# Patient Record
Sex: FEMALE | Race: BLACK OR AFRICAN AMERICAN | NOT HISPANIC OR LATINO | Employment: FULL TIME | URBAN - METROPOLITAN AREA
[De-identification: names, ages, dates, MRNs, and addresses within clinical notes are randomized per-mention and may not be internally consistent; named-entity substitution may affect disease eponyms.]

---

## 2018-07-17 ENCOUNTER — HOSPITAL ENCOUNTER (EMERGENCY)
Facility: HOSPITAL | Age: 33
Discharge: HOME | End: 2018-07-17
Payer: MEDICAID

## 2018-07-17 VITALS
RESPIRATION RATE: 16 BRPM | WEIGHT: 244 LBS | OXYGEN SATURATION: 99 % | HEIGHT: 64 IN | DIASTOLIC BLOOD PRESSURE: 74 MMHG | SYSTOLIC BLOOD PRESSURE: 118 MMHG | BODY MASS INDEX: 41.66 KG/M2 | TEMPERATURE: 98 F | HEART RATE: 82 BPM

## 2018-07-17 DIAGNOSIS — R11.0 NAUSEA: Primary | ICD-10-CM

## 2018-07-17 DIAGNOSIS — R19.7 DIARRHEA, UNSPECIFIED TYPE: ICD-10-CM

## 2018-07-17 DIAGNOSIS — R10.9 ABDOMINAL CRAMPING: ICD-10-CM

## 2018-07-17 PROCEDURE — 99283 EMERGENCY DEPT VISIT LOW MDM: CPT

## 2018-07-17 RX ORDER — ONDANSETRON 4 MG/1
4 TABLET, FILM COATED ORAL EVERY 6 HOURS PRN
Qty: 12 TABLET | Refills: 0 | Status: SHIPPED | OUTPATIENT
Start: 2018-07-17 | End: 2018-07-20

## 2018-07-17 RX ORDER — DICYCLOMINE HYDROCHLORIDE 20 MG/1
20 TABLET ORAL 3 TIMES DAILY
Qty: 9 TABLET | Refills: 0 | Status: SHIPPED | OUTPATIENT
Start: 2018-07-17 | End: 2018-07-20

## 2018-07-17 NOTE — ED PROVIDER NOTES
Encounter Date: 2018       History     Chief Complaint   Patient presents with    ate outdated food     Pt states ate outdated popcorn and is having nausea     33-year-old female presents to ED with complaints of nausea, abdominal cramping, and diarrhea for 2 days status post eating outdated popcorn.  Denies hematochezia, fever, vomiting, associated symptoms.  Tolerating by mouth and food without difficulty.          Review of patient's allergies indicates:  No Known Allergies  History reviewed. No pertinent past medical history.  Past Surgical History:   Procedure Laterality Date     SECTION       Family History   Problem Relation Age of Onset    No Known Problems Mother     No Known Problems Father      Social History   Substance Use Topics    Smoking status: Never Smoker    Smokeless tobacco: Never Used    Alcohol use No     Review of Systems   Constitutional: Negative.  Negative for appetite change and fever.   HENT: Negative.  Negative for ear discharge, ear pain and sore throat.    Eyes: Negative.  Negative for pain.   Respiratory: Negative.  Negative for chest tightness and shortness of breath.    Cardiovascular: Negative.  Negative for chest pain and palpitations.   Gastrointestinal: Positive for diarrhea and nausea. Negative for abdominal distention, abdominal pain, anal bleeding, constipation, rectal pain and vomiting.   Endocrine: Negative.  Negative for heat intolerance and polyphagia.   Genitourinary: Negative.  Negative for dysuria and pelvic pain.   Musculoskeletal: Negative.  Negative for back pain and myalgias.   Skin: Negative.  Negative for color change and rash.   Allergic/Immunologic: Negative.    Neurological: Negative.  Negative for weakness and headaches.   Hematological: Negative.  Does not bruise/bleed easily.   Psychiatric/Behavioral: Negative.    All other systems reviewed and are negative.      Physical Exam     Initial Vitals [18 1611]   BP Pulse Resp Temp SpO2    114/82 87 18 98.8 °F (37.1 °C) 97 %      MAP       --         Physical Exam    Nursing note and vitals reviewed.  Constitutional: Vital signs are normal. She appears well-developed and well-nourished.  Non-toxic appearance. No distress.   HENT:   Head: Normocephalic and atraumatic.   Nose: Nose normal.   Mouth/Throat: Uvula is midline and oropharynx is clear and moist.   Mucous membranes moist   Eyes: Conjunctivae, EOM and lids are normal.   Neck: Trachea normal, normal range of motion and full passive range of motion without pain. Neck supple. No thyromegaly present.   Cardiovascular: Normal rate, regular rhythm, S1 normal, S2 normal and normal heart sounds.   Pulmonary/Chest: Effort normal and breath sounds normal. No accessory muscle usage or stridor. No respiratory distress. She has no decreased breath sounds. She has no wheezes.   Abdominal: Soft. Normal appearance and bowel sounds are normal. She exhibits no distension. There is no tenderness. There is no rigidity, no rebound, no guarding and no CVA tenderness.   Musculoskeletal: Normal range of motion. She exhibits no tenderness.   Neurological: She is alert and oriented to person, place, and time. She has normal strength. No cranial nerve deficit or sensory deficit. GCS eye subscore is 4. GCS verbal subscore is 5. GCS motor subscore is 6.   Skin: Skin is warm and dry. Capillary refill takes less than 2 seconds.   Psychiatric: She has a normal mood and affect.         ED Course   Procedures  Labs Reviewed - No data to display       Imaging Results    None          Medical Decision Making:   Initial Assessment:   33-year-old female presents to ED with complaints of nausea, abdominal cramping, and diarrhea for 2 days status post eating outdated popcorn.  Denies hematochezia, fever, vomiting, associated symptoms.  Tolerating by mouth and food without difficulty.  Differential Diagnosis:   Food poisoning  Gastroenteritis  Nausea and vomiting  ED  Management:  Stable for discharge.  Zofran for nausea and increased fluids.  PCP recheck                   ED Course as of Jul 17 1711   Tue Jul 17, 2018   1659 No distress noted and pt well appearing.  Mucous membranes moist and skin supple.  Abdomen soft and non tender.  Tolerating p.o.  Will treat nausea with zofran.  Increased fluids encourage.  Pt non toxic appearing and stable for discharge.  In agreement with plan of care.  PCP recheck recommended in 2-3 days.  Zofran as needed for nausea and Bentyl as needed for cramping  [LG]      ED Course User Index  [LG] Regina Dewey NP     Clinical Impression:   The primary encounter diagnosis was Nausea. Diagnoses of Diarrhea, unspecified type and Abdominal cramping were also pertinent to this visit.                             Regina Dewey NP  07/18/18 [id number]

## 2018-07-17 NOTE — DISCHARGE INSTRUCTIONS
Drink plenty of fluids.  Zofran as needed for nausea and Bentyl as needed for abdominal cramping.  Follow-up primary care in 2-3 days.  Return for any worsening of symptoms or complications including abdominal pain, projectile vomiting, fever, inability to down fluids or medications, bloody stool, vomiting blood, or any other issues.

## 2019-12-27 ENCOUNTER — HOSPITAL ENCOUNTER (EMERGENCY)
Facility: HOSPITAL | Age: 34
Discharge: HOME | End: 2019-12-27
Attending: EMERGENCY MEDICINE
Payer: COMMERCIAL

## 2019-12-27 VITALS
BODY MASS INDEX: 46.1 KG/M2 | HEART RATE: 81 BPM | SYSTOLIC BLOOD PRESSURE: 132 MMHG | WEIGHT: 270 LBS | TEMPERATURE: 98 F | RESPIRATION RATE: 18 BRPM | DIASTOLIC BLOOD PRESSURE: 72 MMHG | HEIGHT: 64 IN | OXYGEN SATURATION: 100 %

## 2019-12-27 DIAGNOSIS — M25.562 ACUTE PAIN OF BOTH KNEES: ICD-10-CM
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
## 2019-12-27 DIAGNOSIS — M25.561 ACUTE PAIN OF BOTH KNEES: ICD-10-CM

## 2019-12-27 DIAGNOSIS — S39.012A LUMBAR STRAIN, INITIAL ENCOUNTER: ICD-10-CM

## 2019-12-27 PROCEDURE — 99284 EMERGENCY DEPT VISIT MOD MDM: CPT | Mod: 25,ER

## 2019-12-27 PROCEDURE — 25000003 PHARM REV CODE 250: Mod: ER | Performed by: PHYSICIAN ASSISTANT

## 2019-12-27 RX ORDER — KETOROLAC TROMETHAMINE 10 MG/1
10 TABLET, FILM COATED ORAL
Status: COMPLETED | OUTPATIENT
Start: 2019-12-27 | End: 2019-12-27

## 2019-12-27 RX ORDER — METHOCARBAMOL 500 MG/1
500 TABLET, FILM COATED ORAL 3 TIMES DAILY
Qty: 15 TABLET | Refills: 0 | Status: SHIPPED | OUTPATIENT
Start: 2019-12-27 | End: 2020-01-01

## 2019-12-27 RX ORDER — KETOROLAC TROMETHAMINE 30 MG/ML
30 INJECTION, SOLUTION INTRAMUSCULAR; INTRAVENOUS
Status: DISCONTINUED | OUTPATIENT
Start: 2019-12-27 | End: 2019-12-27

## 2019-12-27 RX ORDER — KETOROLAC TROMETHAMINE 10 MG/1
10 TABLET, FILM COATED ORAL EVERY 6 HOURS
Qty: 10 TABLET | Refills: 0 | Status: SHIPPED | OUTPATIENT
Start: 2019-12-27 | End: 2022-03-22 | Stop reason: CLARIF

## 2019-12-27 RX ADMIN — KETOROLAC TROMETHAMINE 10 MG: 10 TABLET, FILM COATED ORAL at 06:12

## 2019-12-27 NOTE — ED NOTES
APPEARANCE: Alert, oriented and in no acute distress.  CARDIAC: Normal rate and rhythm, no murmur heard.   PERIPHERAL VASCULAR: peripheral pulses present. Normal cap refill. No edema. Warm to touch.    RESPIRATORY:Normal rate and effort, breath sounds clear bilaterally throughout chest. Respirations are equal and unlabored no obvious signs of distress.  GASTRO: soft, bowel sounds normal, no tenderness, no abdominal distention.  MUSC: Complains of left lower back pain that radiates to top of buttocks. No obvious deformity. Also complains of right knee pain.   SKIN: Skin is warm and dry, normal skin turgor, mucous membranes moist.  NEURO: 5/5 strength major flexors/extensors bilaterally. Sensory intact to light touch bilaterally. Cj coma scale: eyes open spontaneously-4, oriented & converses-5, obeys commands-6. No neurological abnormalities.   MENTAL STATUS: awake, alert and aware of environment.  EYE: PERRL, both eyes: pupils brisk and reactive to light. Normal size.  ENT: EARS: no obvious drainage. NOSE: no active bleeding.
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
## 2022-03-22 ENCOUNTER — HOSPITAL ENCOUNTER (EMERGENCY)
Facility: HOSPITAL | Age: 37
Discharge: HOME | End: 2022-03-22
Attending: EMERGENCY MEDICINE

## 2022-03-22 VITALS
DIASTOLIC BLOOD PRESSURE: 89 MMHG | OXYGEN SATURATION: 100 % | HEART RATE: 100 BPM | SYSTOLIC BLOOD PRESSURE: 158 MMHG | RESPIRATION RATE: 18 BRPM | TEMPERATURE: 98 F

## 2022-03-22 DIAGNOSIS — R05.9 COUGH: ICD-10-CM

## 2022-03-22 DIAGNOSIS — J06.9 VIRAL URI WITH COUGH: Primary | ICD-10-CM

## 2022-03-22 PROCEDURE — 99284 EMERGENCY DEPT VISIT MOD MDM: CPT | Mod: 25,ER

## 2022-03-22 RX ORDER — FLUTICASONE PROPIONATE 50 MCG
2 SPRAY, SUSPENSION (ML) NASAL 2 TIMES DAILY PRN
Qty: 15 G | Refills: 0 | Status: SHIPPED | OUTPATIENT
Start: 2022-03-22

## 2022-03-22 RX ORDER — BENZONATATE 100 MG/1
100 CAPSULE ORAL 3 TIMES DAILY PRN
Qty: 20 CAPSULE | Refills: 0 | Status: SHIPPED | OUTPATIENT
Start: 2022-03-22 | End: 2022-04-21

## 2022-03-22 NOTE — ED PROVIDER NOTES
Encounter Date: 3/22/2022       History     Chief Complaint   Patient presents with    Cough     I am coughing and having sinus problems for 3 weeks. It did go away and then it came right back. I smell infection in my nose and its making me nauseated      37-year-old female presents with cough and sinus congestion for the past 3 weeks.  Patient has been taking Aleve, Tylenol and DayQuil without improvement in symptoms.  No sore throat.  No abdominal pain, vomiting, diarrhea chest pain.          Review of patient's allergies indicates:  No Known Allergies  History reviewed. No pertinent past medical history.  Past Surgical History:   Procedure Laterality Date     SECTION       Family History   Problem Relation Age of Onset    No Known Problems Mother     No Known Problems Father      Social History     Tobacco Use    Smoking status: Never Smoker    Smokeless tobacco: Never Used   Substance Use Topics    Alcohol use: No    Drug use: No     Review of Systems   Constitutional: Negative for fever.   HENT: Positive for congestion. Negative for sore throat.    Respiratory: Positive for cough. Negative for shortness of breath.    Cardiovascular: Negative for chest pain.   Gastrointestinal: Positive for nausea.   Neurological: Positive for headaches.       Physical Exam     Initial Vitals [22 0944]   BP Pulse Resp Temp SpO2   (!) 161/97 (!) 113 15 98.4 °F (36.9 °C) 100 %      MAP       --         Physical Exam    Nursing note and vitals reviewed.  HENT:   Head: Atraumatic.   Mouth/Throat: Oropharynx is clear and moist.   Sinus congestion   Eyes: Conjunctivae and EOM are normal. Pupils are equal, round, and reactive to light.   Neck:   Normal range of motion.  Cardiovascular: Exam reveals no gallop and no friction rub.    No murmur heard.  Pulmonary/Chest: Breath sounds normal. No respiratory distress.   Musculoskeletal:      Cervical back: Normal range of motion.     Neurological: She is alert and  oriented to person, place, and time.         ED Course   Procedures  Labs Reviewed - No data to display       Imaging Results          X-Ray Chest PA And Lateral (Final result)  Result time 03/22/22 10:11:22    Final result by USMAN Kelly Sr., MD (03/22/22 10:11:22)                 Impression:      Normal study.      Electronically signed by: Shyam Kelly MD  Date:    03/22/2022  Time:    10:11             Narrative:    EXAMINATION:  XR CHEST PA AND LATERAL    CLINICAL HISTORY:  Cough, unspecified    COMPARISON:  None    FINDINGS:  The size and contour of the heart are normal. The lungs are clear. There is no pneumothorax or pleural effusion.                                 Medications - No data to display  Medical Decision Making:   Initial Assessment:   37-year-old female presenting with sinus congestion and cough.  Appears well.  Lungs are clear.  Offered COVID swab but patient is declining.  Chest x-ray shows no infiltrates to suggest pneumonia.  Suspect viral upper respiratory infection with bronchitis.  No indication for antibiotics at this time.  Plan for symptomatic treatment and primary care follow-up.                      Clinical Impression:   Final diagnoses:  [R05.9] Cough  [J06.9] Viral URI with cough (Primary)          ED Disposition Condition    Discharge Stable        ED Prescriptions     Medication Sig Dispense Start Date End Date Auth. Provider    fluticasone propionate (FLONASE) 50 mcg/actuation nasal spray 2 sprays (100 mcg total) by Each Nostril route 2 (two) times daily as needed for Rhinitis (congestion). 15 g 3/22/2022  Vasyl Fernandez MD    benzonatate (TESSALON) 100 MG capsule Take 1 capsule (100 mg total) by mouth 3 (three) times daily as needed for Cough. 20 capsule 3/22/2022 4/21/2022 Vasyl Fernandez MD        Follow-up Information     Follow up With Specialties Details Why Contact Info    Gudelia Sargent MD Internal Medicine Schedule an appointment as soon as possible for a  visit in 1 week  [address]  [address]  [facility]  [place] [address]  [phone]      Primary care  Schedule an appointment as soon as possible for a visit in 1 week             Vasyl Fernandez MD  03/22/22 8806

## 2022-03-22 NOTE — Clinical Note
"Gisele Panchalarash Shukla was seen and treated in our emergency department on 3/22/2022.  She may return to work on 03/24/2022.       If you have any questions or concerns, please don't hesitate to call.       RN    "

## 2023-11-14 ENCOUNTER — HOSPITAL ENCOUNTER (EMERGENCY)
Facility: HOSPITAL | Age: 38
Discharge: HOME | End: 2023-11-14
Attending: EMERGENCY MEDICINE
Payer: MEDICAID

## 2023-11-14 VITALS
RESPIRATION RATE: 19 BRPM | SYSTOLIC BLOOD PRESSURE: 145 MMHG | BODY MASS INDEX: 44.83 KG/M2 | OXYGEN SATURATION: 99 % | HEART RATE: 77 BPM | WEIGHT: 253 LBS | DIASTOLIC BLOOD PRESSURE: 84 MMHG | HEIGHT: 63 IN | TEMPERATURE: 98 F

## 2023-11-14 DIAGNOSIS — M25.562 ACUTE PAIN OF LEFT KNEE: ICD-10-CM

## 2023-11-14 DIAGNOSIS — M25.532 LEFT WRIST PAIN: ICD-10-CM
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
## 2023-11-14 DIAGNOSIS — T14.90XA INJURY: ICD-10-CM

## 2023-11-14 LAB
B-HCG UR QL: NEGATIVE
CTP QC/QA: YES

## 2023-11-14 PROCEDURE — 81025 URINE PREGNANCY TEST: CPT | Mod: ER | Performed by: NURSE PRACTITIONER

## 2023-11-14 PROCEDURE — 99284 EMERGENCY DEPT VISIT MOD MDM: CPT | Mod: ER

## 2023-11-14 PROCEDURE — 63600175 PHARM REV CODE 636 W HCPCS: Mod: ER

## 2023-11-14 PROCEDURE — 96372 THER/PROPH/DIAG INJ SC/IM: CPT

## 2023-11-14 RX ORDER — KETOROLAC TROMETHAMINE 30 MG/ML
30 INJECTION, SOLUTION INTRAMUSCULAR; INTRAVENOUS
Status: COMPLETED | OUTPATIENT
Start: 2023-11-14 | End: 2023-11-14

## 2023-11-14 RX ORDER — KETOROLAC TROMETHAMINE 10 MG/1
10 TABLET, FILM COATED ORAL EVERY 6 HOURS
Qty: 20 TABLET | Refills: 0 | OUTPATIENT
Start: 2023-11-14 | End: 2023-11-14

## 2023-11-14 RX ORDER — KETOROLAC TROMETHAMINE 10 MG/1
10 TABLET, FILM COATED ORAL EVERY 6 HOURS
Qty: 20 TABLET | Refills: 0 | Status: SHIPPED | OUTPATIENT
Start: 2023-11-14 | End: 2023-11-14 | Stop reason: SDUPTHER

## 2023-11-14 RX ORDER — KETOROLAC TROMETHAMINE 10 MG/1
10 TABLET, FILM COATED ORAL EVERY 6 HOURS
Qty: 20 TABLET | Refills: 0 | Status: SHIPPED | OUTPATIENT
Start: 2023-11-14 | End: 2023-11-19

## 2023-11-14 RX ORDER — KETOROLAC TROMETHAMINE 10 MG/1
10 TABLET, FILM COATED ORAL EVERY 6 HOURS
Qty: 20 TABLET | Refills: 0 | Status: SHIPPED | OUTPATIENT
Start: 2023-11-14 | End: 2023-11-14 | Stop reason: ALTCHOICE

## 2023-11-14 RX ADMIN — KETOROLAC TROMETHAMINE 30 MG: 30 INJECTION, SOLUTION INTRAMUSCULAR; INTRAVENOUS at 02:11

## 2023-11-14 NOTE — Clinical Note
"Gisele Shukla (TOYA) was seen and treated in our emergency department on 11/14/2023.  She may return to work on 11/15/2023.       If you have any questions or concerns, please don't hesitate to call.      Rachelle David PA-C"

## 2023-11-14 NOTE — FIRST PROVIDER EVALUATION
Emergency Department TeleTriage Encounter Note      CHIEF COMPLAINT    Chief Complaint   Patient presents with    Fall     Pt fell slipped on wet floor on November 3rd. Landed on left side and abdomen. No loc. C/o left leg/knee pain down to ankle and pain to left wrist. No open wounds ambulatory to triage.        VITAL SIGNS   Initial Vitals [11/14/23 1313]   BP Pulse Resp Temp SpO2   (!) 145/84 77 19 97.5 °F (36.4 °C) 99 %      MAP       --            ALLERGIES    Review of patient's allergies indicates:  No Known Allergies    PROVIDER TRIAGE NOTE  Verbal consent for the teletriage evaluation was given by the patient at the start of the evaluation.  All efforts will be made to maintain patient's privacy during the evaluation.      This is a teletriage evaluation of a 38 y.o. female presenting to the ED with c/o slip and fall on 11/3/2023; pain to left wrist and left knee down to ankle.  Patient has not had this evaluated. Limited physical exam via telehealth: The patient is awake, alert, answering questions appropriately and is not in respiratory distress.  As the Teletriage provider, I performed an initial assessment and ordered appropriate labs and imaging studies, if any, to facilitate the patient's care once placed in the ED. Once a room is available, care and a full evaluation will be completed by an alternate ED provider.  Any additional orders and the final disposition will be determined by that provider.  All imaging and labs will not be followed-up by the Teletriage Team, including myself.          ORDERS  Labs Reviewed   POCT URINE PREGNANCY       ED Orders (720h ago, onward)      Start Ordered     Status Ordering Provider    11/14/23 1319 11/14/23 1319  POCT urine pregnancy  Once         Ordered TANISHA SEPULVEDA    11/14/23 1319 11/14/23 1319  X-Ray Knee 3 View Left  1 time imaging         Ordered TANISHA SEPULVEDA    11/14/23 1319 11/14/23 1319  X-Ray Wrist Complete Left  1 time imaging         Ordered  COCOTANISHA BRAY    11/14/23 1319 11/14/23 1319  X-Ray Tibia Fibula 2 View Left  1 time imaging         Ordered TANISHA SEPULVEDA    11/14/23 1319 11/14/23 1319  X-Ray Ankle Complete Left  1 time imaging         Ordered COCOTANISHA BRAY ZEEKIEL              Virtual Visit Note: The provider triage portion of this emergency department evaluation and documentation was performed via [employer], a HIPAA-compliant telemedicine application, in concert with a tele-presenter in the room. A face to face patient evaluation with one of my colleagues will occur once the patient is placed in an emergency department room.      DISCLAIMER: This note was prepared with [employer] voice recognition transcription software. Garbled syntax, mangled pronouns, and other bizarre constructions may be attributed to that software system.

## 2023-11-14 NOTE — ED PROVIDER NOTES
"Encounter Date: 2023       History     Chief Complaint   Patient presents with    Fall     Pt fell slipped on wet floor on . Landed on left side and abdomen. No loc. C/o left leg/knee pain down to ankle and pain to left wrist. No open wounds ambulatory to triage.      Patient is a 38-year-old who presents to emergency for left wrist and knee pain that onset about 10 days ago.  Patient states that she was on an elevator getting off when she slipped on some tea or coffee on the ground.  She lost her footing and hit her left knee on the ground.  Since then, she is been unable to sleep or operate at baseline due to pain.  She currently works as a  and can not stand for long periods of time.  Patient locates pain diffusely around left knee and reports it radiates down her leg to her left ankle with some swelling.  Wrist pain is located to medial side that is worse with certain movements.  Denies numbness, tingling, weakness, nausea, vomiting, abdominal pain, head injury, loss of consciousness, vision changes, chest pain, shortness of breath, or overlying skin changes.  Prior treatment includes "lots and lots" of Tylenol and Advil.    The history is provided by the patient. No  was used.     Review of patient's allergies indicates:  No Known Allergies  No past medical history on file.  Past Surgical History:   Procedure Laterality Date     SECTION       Family History   Problem Relation Age of Onset    No Known Problems Mother     No Known Problems Father      Social History     Tobacco Use    Smoking status: Never    Smokeless tobacco: Never   Substance Use Topics    Alcohol use: No    Drug use: No     Review of Systems   Constitutional:  Negative for chills, diaphoresis, fatigue and fever.   HENT:  Negative for congestion, sore throat and trouble swallowing.    Respiratory:  Negative for cough and shortness of breath.    Cardiovascular:  Negative for chest pain and " palpitations.   Gastrointestinal:  Negative for abdominal pain, blood in stool, constipation, diarrhea, nausea and vomiting.   Genitourinary:  Negative for difficulty urinating, dysuria, frequency and urgency.   Musculoskeletal:  Positive for joint swelling (left knee). Negative for back pain and myalgias.        + Left wrist pain  + Left ankle pain   Skin:  Negative for color change, rash and wound.   Neurological:  Negative for weakness, light-headedness, numbness and headaches.       Physical Exam     Initial Vitals [11/14/23 1313]   BP Pulse Resp Temp SpO2   (!) 145/84 77 19 97.5 °F (36.4 °C) 99 %      MAP       --         Physical Exam    Nursing note and vitals reviewed.  Constitutional: She appears well-developed and well-nourished. She is not diaphoretic. No distress.   HENT:   Head: Normocephalic and atraumatic.   Right Ear: External ear normal.   Left Ear: External ear normal.   Eyes: Conjunctivae and EOM are normal. Pupils are equal, round, and reactive to light.   Neck: Neck supple.   Normal range of motion.  Pulmonary/Chest: No respiratory distress.   Musculoskeletal:        Hands:       Cervical back: Normal range of motion and neck supple.      Right ankle: No swelling or deformity. No tenderness. Normal range of motion.      Right Achilles Tendon: No tenderness.      Left ankle: No swelling or deformity. Tenderness (diffuse ankle) present. Normal range of motion.      Left Achilles Tendon: No tenderness.        Legs:       Comments: Strength 4/5 to bilateral knee and leg. Sensation intact throughout.     Neurological: She is alert and oriented to person, place, and time. She has normal strength. No cranial nerve deficit or sensory deficit.   Skin: Skin is warm.   Psychiatric: She has a normal mood and affect. Her behavior is normal. Thought content normal.         ED Course   Procedures  Labs Reviewed   POCT URINE PREGNANCY          Imaging Results              X-Ray Knee 3 View Left (Final result)   Result time 11/14/23 14:05:20      Final result by Nelson Kaba MD (11/14/23 14:05:20)                   Impression:      No acute finding.      Electronically signed by: Nelson Kaba  Date:    11/14/2023  Time:    14:05               Narrative:    EXAMINATION:  XR KNEE 3 VIEW LEFT    CLINICAL HISTORY:  Injury, unspecified, initial encounter    TECHNIQUE:  AP, lateral, and Merchant views of the left knee were performed.    COMPARISON:  December 27, 2019.    FINDINGS:  Moderate medial compartment joint space narrowing progressed when compared to prior with osteophytosis of the mediolateral compartments.  No effusion no acute fracture or dislocation.  Similar irregular appearance of the proximal fibular head.                                       X-Ray Wrist Complete Left (Final result)  Result time 11/14/23 14:05:44      Final result by Nelson Kaba MD (11/14/23 14:05:44)                   Impression:      No acute osseous finding.      Electronically signed by: Nelson Kaba  Date:    11/14/2023  Time:    14:05               Narrative:    EXAMINATION:  XR WRIST COMPLETE 3 VIEWS LEFT    CLINICAL HISTORY:  Injury, unspecified, initial encounter    TECHNIQUE:  PA, lateral, and oblique views of the left wrist were performed.    COMPARISON:  None    FINDINGS:  No acute fracture or dislocation.  No significant degenerative changes.  Lisfranc joint is maintained.                                       X-Ray Ankle Complete Left (Final result)  Result time 11/14/23 14:06:04      Final result by Nelson Kaba MD (11/14/23 14:06:04)                   Impression:      No acute finding      Electronically signed by: Nelson Kaba  Date:    11/14/2023  Time:    14:06               Narrative:    EXAMINATION:  XR ANKLE COMPLETE 3 VIEW LEFT    CLINICAL HISTORY:  Injury, unspecified, initial encounter    TECHNIQUE:  AP, lateral and oblique views of the left ankle were performed.    COMPARISON:  None    FINDINGS:  No  acute fracture or dislocation.  Ankle mortise is maintained.                                       Medications   ketorolac injection 30 mg (30 mg Intramuscular Given 11/14/23 1450)     Medical Decision Making  Patient presents for multiple areas of pain after mechanical fall.  Blood pressure 145/84.  Vital signs otherwise stable.  Physical exam as stated above.    Differential Diagnosis includes, but is not limited to fracture, dislocation, nerve injury/palsy, vascular injury, cellulitis, bursitis, muscle strain, ligament tear/sprain, laceration, abrasion, or soft tissue contusion.  X-ray without signs of fracture or dislocation.  I do not suspect cellulitis, as patient without overlying skin changes or warmth.  No laceration or abrasions noted on exam.  Clinical presentation and physical exam most suggestive of contusion versus muscle strain.  Can not rule out ligament tear/sprain at this time.  Patient given Toradol shot for pain.  Will prescribe Toradol to take upon discharge.  Referral placed for Orthopedics for further workup and management of pain.  Advised patient on RICE therapy for extra relief.     I see no indication of an emergent process beyond that addressed during our encounter. Patient stable for discharge at this time. I have counseled the patient regarding follow up with PCP/ortho and gave strict return precautions. I have discussed the final diagnosis and gave instructions regarding prescribed medications. Patient verbalized understanding and is agreeable.     Amount and/or Complexity of Data Reviewed  Radiology: ordered. Decision-making details documented in ED Course.               ED Course as of 11/14/23 1507   Tue Nov 14, 2023   1356 POCT urine pregnancy  Urine pregnancy test negative. [BJ]   1408 X-Ray Ankle Complete Left  FINDINGS:  No acute fracture or dislocation.  Ankle mortise is maintained.     Impression:     No acute finding [BJ]   1408 X-Ray Wrist Complete Left  FINDINGS:  No acute  fracture or dislocation.  No significant degenerative changes.  Lisfranc joint is maintained.     Impression:     No acute osseous finding. [BJ]   1409 X-Ray Knee 3 View Left  FINDINGS:  Moderate medial compartment joint space narrowing progressed when compared to prior with osteophytosis of the mediolateral compartments.  No effusion no acute fracture or dislocation.  Similar irregular appearance of the proximal fibular head.     Impression:     No acute finding. [BJ]   1409 Independent interpretation of ankle, wrist, knee x-ray without acute findings of fracture or dislocation. [BJ]      ED Course User Index  [BJ] Rachelle David PA-C                      Clinical Impression:   Final diagnoses:  [T14.90XA] Injury  [W19.XXXA] Fall, initial encounter (Primary)  [M25.532] Left wrist pain  [M25.562] Acute pain of left knee        ED Disposition Condition    Discharge Stable          ED Prescriptions       Medication Sig Dispense Start Date End Date Auth. Provider    ketorolac (TORADOL) 10 mg tablet  (Status: Discontinued) Take 1 tablet (10 mg total) by mouth every 6 (six) hours. for 5 days 20 tablet 11/14/2023 11/14/2023 Rachelle David PA-C    ketorolac (TORADOL) 10 mg tablet  (Status: Discontinued) Take 1 tablet (10 mg total) by mouth every 6 (six) hours. for 5 days 20 tablet 11/14/2023 11/14/2023 Rachelle David PA-C    ketorolac (TORADOL) 10 mg tablet  (Status: Discontinued) Take 1 tablet (10 mg total) by mouth every 6 (six) hours. for 5 days 20 tablet 11/14/2023 11/14/2023 Rachelle David PA-C    ketorolac (TORADOL) 10 mg tablet  (Status: Discontinued) Take 1 tablet (10 mg total) by mouth every 6 (six) hours. for 5 days 20 tablet 11/14/2023 11/14/2023 Rachelle David PA-C    ketorolac (TORADOL) 10 mg tablet  (Status: Discontinued) Take 1 tablet (10 mg total) by mouth every 6 (six) hours. for 5 days 20 tablet 11/14/2023 11/14/2023 Rachelle David PA-C    ketorolac (TORADOL) 10 mg tablet Take 1 tablet (10 mg total) by  mouth every 6 (six) hours. for 5 days 20 tablet 11/14/2023 11/19/2023 Rachelle David PA-C          Follow-up Information    None          Rachelle David PA-C  11/14/23 4240
